# Patient Record
Sex: MALE | Race: OTHER | Employment: FULL TIME | URBAN - METROPOLITAN AREA
[De-identification: names, ages, dates, MRNs, and addresses within clinical notes are randomized per-mention and may not be internally consistent; named-entity substitution may affect disease eponyms.]

---

## 2024-03-11 ENCOUNTER — HOSPITAL ENCOUNTER (OUTPATIENT)
Age: 29
Discharge: HOME | End: 2024-03-11
Payer: COMMERCIAL

## 2024-03-11 VITALS
SYSTOLIC BLOOD PRESSURE: 144 MMHG | HEIGHT: 68 IN | WEIGHT: 220 LBS | HEART RATE: 58 BPM | TEMPERATURE: 98 F | DIASTOLIC BLOOD PRESSURE: 83 MMHG | OXYGEN SATURATION: 99 % | RESPIRATION RATE: 18 BRPM | BODY MASS INDEX: 33.34 KG/M2

## 2024-03-11 DIAGNOSIS — S86.011A RUPTURE OF RIGHT ACHILLES TENDON, INITIAL ENCOUNTER: Primary | ICD-10-CM

## 2024-03-11 PROCEDURE — 99203 OFFICE O/P NEW LOW 30 MIN: CPT | Performed by: NURSE PRACTITIONER

## 2024-03-11 RX ORDER — HYDROCODONE BITARTRATE AND ACETAMINOPHEN 5; 325 MG/1; MG/1
1 TABLET ORAL EVERY 6 HOURS PRN
Qty: 20 TABLET | Refills: 0 | Status: SHIPPED | OUTPATIENT
Start: 2024-03-11 | End: 2024-03-16

## 2024-03-11 NOTE — ED PROVIDER NOTES
Patient Seen in: [facility]      History     Chief Complaint   Patient presents with    Leg Pain     Stated Complaint: Achilles pain - pat believes it is ruptured    Subjective:   HPI  28-year-old male presents to the IC with complaints of a right calf/Achilles injury.  Patient was playing soccer last Monday, when he felt a sudden snap to the back of the calf.  Since then he has had increasing pain swelling and bruising to the area.  Has had a hard time walking and bearing weight on the extremity  The patient's medication list, past medical history and social history elements as listed in today's nurse's notes were reviewed and agreed (except as otherwise stated in the HPI).  The patient's family history reviewed and determined to be noncontributory to the presenting problem.      Objective:   History reviewed. No pertinent past medical history.           History reviewed. No pertinent surgical history.             Social History     Socioeconomic History    Marital status:    Tobacco Use    Smoking status: Never    Smokeless tobacco: Never   Substance and Sexual Activity    Drug use: Yes     Types: Cannabis              Review of Systems   Musculoskeletal:         Right calf pain/Achilles pain   All other systems reviewed and are negative.      Positive for stated complaint: Achilles pain - pat believes it is ruptured  Other systems are as noted in HPI.  Constitutional and vital signs reviewed.      All other systems reviewed and negative except as noted above.    Physical Exam     ED Triage Vitals [03/11/24 1013]   /83   Pulse 58   Resp 18   Temp 98.3 °F (36.8 °C)   Temp src Temporal   SpO2 99 %   O2 Device None (Room air)       Current:/83   Pulse 58   Temp 98.3 °F (36.8 °C) (Temporal)   Resp 18   Ht 172.7 cm (5' 8\")   Wt 99.8 kg   SpO2 99%   BMI 33.45 kg/m²         Physical Exam  Vitals and nursing note reviewed.   Constitutional:       Appearance: Normal appearance.    HENT:      Head: Normocephalic.   Cardiovascular:      Rate and Rhythm: Normal rate.      Pulses: Normal pulses.   Musculoskeletal:      Cervical back: Normal range of motion.      Right lower leg: Swelling and tenderness present. Edema present.      Comments: Ecchymosis is noted to the calf and also to the Achilles region.  Positive Upton's test noted   Skin:     General: Skin is warm.      Capillary Refill: Capillary refill takes less than 2 seconds.   Neurological:      General: No focal deficit present.      Mental Status: He is alert and oriented to person, place, and time. Mental status is at baseline.   Psychiatric:         Mood and Affect: Mood normal.         Behavior: Behavior normal.             ED Course   Labs Reviewed - No data to display  Was placed in a plantarflexion postmold crutches were given.  Patient was advised to follow-up with orthopedics               MDM   Pertinent Labs & Imaging studies reviewed. (See chart for details)  Differential diagnosis considered but not limited to: Achilles rupture, calf strain and Contusion  Patient coming in with Achilles pain. . Will treat for possible possible Achilles tendon rupture. Will discharge on [place] orthopedic follow-up. Patient is comfortable with this plan.  Overall Pt looks good. Non-toxic, well-hydrated and in no respiratory distress. Vital signs are reassuring. Exam is reassuring. I do not believe pt  requires and additional  diagnostic studiesor intervention. I believe pt  can be discharged home to continue evaluation as an outpatient. Follow-up provider given. Discharge instructions given and reviewed. Return for any problems. All understand and agreewith the plan.    Please note that this report has been produced using speech recognition software and may contain errors related to that system including, but not limited to, errors in grammar, punctuation, and spelling, as well as words and phrases that possibly may have been recognized  inappropriately.  If there are any questions or concerns, contact the dictating provider for clarification.    Note to patient: The 21st Century Cures Act makes medical notes like these available to patients in the interest of transparency. However, this is a medical document intended as peer to peer communication. It is written in medical language and may contain abbreviations or verbiage that are unfamiliar. It may appear blunt or direct. Medical documents are intended to carry relevant information, facts as evident, and the clinical opinion of the practitioner.                                    Medical Decision Making      Disposition and Plan     Clinical Impression:  1. Rupture of right Achilles tendon, initial encounter         Disposition:  Discharge  3/11/2024 10:38 am    Follow-up:  Vidal Diallo DO  [address] [place] [address] [phone]                Medications Prescribed:  Discharge Medication List as of 3/11/2024 10:41 AM        START taking these medications    Details   HYDROcodone-acetaminophen 5-325 MG Oral Tab Take 1 tablet by mouth every 6 (six) hours as needed for Pain., Normal, Disp-20 tablet, R-0

## 2024-03-11 NOTE — DISCHARGE INSTRUCTIONS
Follow-up with orthopedics call their office for a follow-up appointment  Wear the postmold for support and comfort do not take it off until you see orthopedics  Use the crutches for support and comfort  Tylenol and Motrin for pain  Use the Norco as needed for breakthrough pain  Return to the emergency room for symptoms or concerns

## 2024-03-13 ENCOUNTER — TELEPHONE (OUTPATIENT)
Dept: ORTHOPEDICS CLINIC | Facility: CLINIC | Age: 29
End: 2024-03-13

## 2024-03-13 DIAGNOSIS — M79.661 PAIN OF RIGHT LOWER LEG: Primary | ICD-10-CM

## 2024-03-14 ENCOUNTER — HOSPITAL ENCOUNTER (OUTPATIENT)
Dept: GENERAL RADIOLOGY | Age: 29
Discharge: HOME | End: 2024-03-14
Attending: FAMILY MEDICINE
Payer: COMMERCIAL

## 2024-03-14 ENCOUNTER — OFFICE VISIT (OUTPATIENT)
Dept: ORTHOPEDICS CLINIC | Facility: CLINIC | Age: 29
End: 2024-03-14
Payer: COMMERCIAL

## 2024-03-14 VITALS — BODY MASS INDEX: 31.5 KG/M2 | HEIGHT: 70 IN | WEIGHT: 220 LBS

## 2024-03-14 DIAGNOSIS — M24.171 DERANGEMENT OF RIGHT ANKLE: ICD-10-CM

## 2024-03-14 DIAGNOSIS — S86.011A RUPTURE OF RIGHT ACHILLES TENDON, INITIAL ENCOUNTER: Primary | ICD-10-CM

## 2024-03-14 DIAGNOSIS — M79.661 PAIN OF RIGHT LOWER LEG: ICD-10-CM

## 2024-03-14 PROCEDURE — 73590 X-RAY EXAM OF LOWER LEG: CPT | Performed by: FAMILY MEDICINE

## 2024-03-14 PROCEDURE — 3008F BODY MASS INDEX DOCD: CPT | Performed by: FAMILY MEDICINE

## 2024-03-14 PROCEDURE — 99204 OFFICE O/P NEW MOD 45 MIN: CPT | Performed by: FAMILY MEDICINE

## 2024-03-14 RX ORDER — DICLOFENAC SODIUM 75 MG/1
75 TABLET, DELAYED RELEASE ORAL 2 TIMES DAILY
Qty: 28 TABLET | Refills: 1 | Status: SHIPPED | OUTPATIENT
Start: 2024-03-14

## 2024-03-14 NOTE — H&P
Sports Medicine Clinic Note    Subjective:     Chief Complaint   Patient presents with    Leg or Foot Injury     Rt lower leg injury  doi: 03.04.2024  - pt tried to sprint from a dead stop and heard a snap  - no previous injury      HPI: This is a pleasant 28 year old male who presents with chief complaint of acute pain in the right calf area.     - The patient reports experiencing a sudden \"pop\" while pushing off the right leg during a soccer scrimmage followed by sharp pain and difficulty walking.   - The patient states that the pain is localized to the lower posterior aspect of his ankle.   - The pain exacerbates when trying to stand on tiptoes.  - Has tried OTC analgesics and ice application with minimal relief.   - Denies any similar past injuries, systemic symptoms like fever, or recent changes in medication.    Objective:     Physical Exam    Ht 5' 10\" (1.778 m)   Wt 220 lb (99.8 kg)   BMI 31.57 kg/m²     Constitutional:   NAD. AOx3. Well-developed and Well-nourished.     Psychiatric:   Normal mood/ affect/ behavior. Judgment and thought content normal.    Focused examination of the right lower extremity:    Inspection:   Antalgic gait  Swelling and ecchymosis observed over the left Achilles tendon area.     Palpation:   Tenderness upon palpation of the right Achilles tendon. A palpable gap is noted.    Active/Passive ROM:   Limited plantar flexion and difficulty standing on tiptoes in the affected extremity. Normal passive ROM about the ankle.    Neurovascular:   Intact sensation to light touch and normal capillary refill time in toes.     Special Orthopedic Tests:   Positive Upton test   Negative talar tilt  Negative ankle drawer testing    Diagnostic Studies    Radiographs of right tib/fib were personally reviewed in the office and no apparent fracture or dislocation is noted. No evidence of bony abnormalities or foreign bodies.    Assessment & Plan:     28 year old male with clinical history and  examination consistent with    Suspected acute rupture of right Achilles tendon    The patient will be treated with the following:    Recommend an MRI to further characterize the patient's injury with close follow up once completed to discuss options. In the interim the patient was fitted in a CAM boot with ankle in resting equinus. We did discuss potential for non-operative treatment for this injury depending on factors such as his MRI, health and overall activity, as well as patient preference. The patient may take NSAIDs for pain pain relief. Instructions for non-weight-bearing were given. A follow-up appointment is planned after MRI to augment treatment plan as necessary.    Vidal Diallo DO, MINERVA   Primary Care Sports Medicine    Department of Orthopaedic Surgery  [address] [address]   [address] [place] [address]    t: [phone]  f: [phone]      [facility]

## 2024-03-20 ENCOUNTER — OFFICE VISIT (OUTPATIENT)
Dept: ORTHOPEDICS CLINIC | Facility: CLINIC | Age: 29
End: 2024-03-20
Payer: COMMERCIAL

## 2024-03-20 ENCOUNTER — TELEPHONE (OUTPATIENT)
Dept: ORTHOPEDICS CLINIC | Facility: CLINIC | Age: 29
End: 2024-03-20

## 2024-03-20 VITALS — BODY MASS INDEX: 34.53 KG/M2 | WEIGHT: 220 LBS | HEIGHT: 67 IN

## 2024-03-20 DIAGNOSIS — S86.011A RUPTURE OF RIGHT ACHILLES TENDON, INITIAL ENCOUNTER: Primary | ICD-10-CM

## 2024-03-20 PROCEDURE — 99215 OFFICE O/P EST HI 40 MIN: CPT | Performed by: ORTHOPAEDIC SURGERY

## 2024-03-20 PROCEDURE — 99417 PROLNG OP E/M EACH 15 MIN: CPT | Performed by: ORTHOPAEDIC SURGERY

## 2024-03-20 PROCEDURE — 3008F BODY MASS INDEX DOCD: CPT | Performed by: ORTHOPAEDIC SURGERY

## 2024-03-20 RX ORDER — ASPIRIN 81 MG/1
81 TABLET, CHEWABLE ORAL DAILY
Qty: 30 TABLET | Refills: 0 | Status: SHIPPED | OUTPATIENT
Start: 2024-03-20 | End: 2024-04-19

## 2024-03-20 RX ORDER — TRAMADOL HYDROCHLORIDE 50 MG/1
TABLET ORAL
Qty: 20 TABLET | Refills: 1 | Status: SHIPPED | OUTPATIENT
Start: 2024-03-20

## 2024-03-20 RX ORDER — ONDANSETRON 4 MG/1
TABLET, FILM COATED ORAL
Qty: 8 TABLET | Refills: 0 | Status: SHIPPED | OUTPATIENT
Start: 2024-03-20

## 2024-03-20 NOTE — PROGRESS NOTES
OR BOOKING SHEET ANKLE  Location: [] [facility]   [] [facility]  Name: Guido Zhou  MRN: [id number]   : 1995     Diagnosis:  [x] Rupture of right Achilles tendon, initial encounter [S86.011A]    Disposition:    [x] Ambulatory  [] Overnight for ZANDER  [] Overnight for observation and pain control  [] Inpatient procedure    Operative Time Required:  90 MINUTES  Antibiotics: 2 g cefazolin within 60 minutes of surgical incision  Procedure:   Laterality: [x] RIGHT [] LEFT                  [] BILATERAL  Procedures:  [x] Achilles Tendon Repair (69590)  [] ORIF Medial Malleolus (42709)  [] ORIF Posterior Malleolus (46487)  [] ORIF Prox Fibula / Fibular Shaft (25328)  [] ORIF Distal Fibula (13917)  [] ORIF Bimalleolar Ankle Fracture (24441)  [] ORIF Trimalleolar Ankle Fracture (02438)  [] ORIF Trimalleolar Ankle Fracture w/ Fixation of Posterior Mal (06026)  [] ORIF Metatarsal (11544)    Additional info:   [] PCP Clearance Needed  [x] Physical Therapy External - [facility]  [] DME Rx Needed  Special Equipment: Arthrex  Assistant request: POONAM Spears

## 2024-03-20 NOTE — H&P
Orthopedic Surgery  [address] [place] [address]  [phone]     NEW PATIENT VISIT - HISTORY AND PHYSICAL EXAMINATION     Name: Guido Zhou   MRN: [id number]  Date: 3/20/2024     CC: Right achilles tendon rupture    REFERRED BY: Vidal Diallo DO    HPI:   Guido Zhou is a very pleasant 28 year old male who presents today for evaluation of right achilles injury sustained March 4, 2024 while playing soccer. He experienced a pop while pushing off the right leg. Pain is 6/10 with swelling, stiffness, weakness and instability. Denies any prior ankle injuries.     He has been unable to ambulate and pushoff with the Achilles in the interval.  He is quite functionally debilitated.    He enjoys playing sports. Works as an .    PMH:   History reviewed. No pertinent past medical history.    PAST SURGICAL HX:  History reviewed. No pertinent surgical history.    FAMILY HX:  History reviewed. No pertinent family history.    ALLERGIES:  Patient has no known allergies.    MEDICATIONS:   Current Outpatient Medications   Medication Sig Dispense Refill    diclofenac 75 MG Oral Tab EC Take 1 tablet (75 mg total) by mouth 2 (two) times daily. 28 tablet 1       ROS: A comprehensive 14 point review of systems was performed and was negative aside from the aforementioned per history of present illness.    SOCIAL HX:  Social History     Tobacco Use    Smoking status: Never    Smokeless tobacco: Never   Substance Use Topics    Alcohol use: Not on file         PE:   Vitals:    03/20/24 1112   Weight: 220 lb   Height: 5' 7\" (1.702 m)     Estimated body mass index is 34.46 kg/m² as calculated from the following:    Height as of this encounter: 5' 7\" (1.702 m).    Weight as of this encounter: 220 lb.    Physical Exam  Constitutional:       Appearance: Normal appearance.   HENT:      Head: Normocephalic and atraumatic.   Eyes:      Extraocular Movements: Extraocular movements intact.   Neck:       Musculoskeletal: Normal range of motion and neck supple.   Cardiovascular:      Pulses: Normal pulses.   Pulmonary:      Effort: Pulmonary effort is normal. No respiratory distress.   Abdominal:      General: There is no distension.   Skin:     General: Skin is warm.      Capillary Refill: Capillary refill takes less than 2 seconds.      Findings: No bruising.   Neurological:      General: No focal deficit present.      Mental Status: Alert.   Psychiatric:         Mood and Affect: Mood normal.     Examination of the right achilles demonstrates:     Skin is intact, warm and dry.     Inspection:   Atrophy: none    Effusion: none    Edema: none    Erythema: none    Hematoma: none      Palpation:   Anterior Talo-Fibular Ligament (ATFL): none    Fibular Ligament (PTFL): none    Calcaneo-Fibular Ligament (CFL): none    Achilles Tendon: significant    Peroneal Tendon: none    Posterior Tib Tendon: none    Talar dome: none    Metatarsal bones: none    Joint line tenderness: none  Crepitation: none     ROM:   Dorsiflexion: 10 degrees.  Plantarflexion: 0 degrees.  Eversion:  20 degrees  Inversion: 30 degrees.    Strength: significantly weak     Special Tests:   Positive Upton's test with loss of ankle dorsiflexion.    No obvious peripheral edema noted.   Distal neurovascular exam demonstrates normal perfusion, intact sensation to light touch and full strength.     Examination of the contralateral foot/ankle demonstrates:  No significant atrophy, swelling or effusion. Full range of motion. Neurovascularly intact distally.    Radiographic Examination/Diagnostics:  XR personally viewed, independently interpreted and radiology report was reviewed.    XR TIBIA + FIBULA (2 VIEWS), RIGHT (CPT=73590)    Result Date: 3/14/2024  CONCLUSION:  No acute fracture or dislocation right tibia or fibula.   LOCATION:  [id number]   Dictated by (CST): Raegan Ty MD on 3/14/2024 at 4:47 PM     Finalized by (CST): Raegan Ty MD on 3/14/2024  at 4:48 PM        IMPRESSION: Guido Zhou is a 28 year old male with right Achilles tendon rupture sustained March 4, 2024 while playing soccer.     Given his goals to return to soccer and high-level athletics, the patient is an appropriate candidate for Achilles tendon repair.     PLAN:   We had a detailed discussion outlining the etiology, anatomy, pathophysiology, and natural history of patient's findings. Imaging was reviewed in detail.    I had a lengthy discussion with Guido about the diagnosis and options, both surgical and nonsurgical. I have recommended that we proceed with Achilles tendon repair as we agree surgical intervention would likely offer the best opportunity for symptomatic relief and functional recovery. I used diagrams, radiographic studies, and a 3-dimensional model to outline his pathology, as well as general surgical principles. We reviewed the risks associated with the proposed procedure.  In particular we discussed risks that include, but are not limited to infection, blood loss, potential transient or permanent injury to nerves or blood vessels, joint stiffness, persistent pain, need for future operation, failure of healing, wound complications, deep vein thrombosis and pulmonary embolism.   We discussed the proposed rehabilitation timeline as well as expected postoperative restrictions. Guido voiced a good understanding of treatment options, risks and benefits, postoperative instructions, rehabilitation timeline, and restrictions. He was given the opportunity to ask questions, which were all answered to the best of my ability and to his satisfaction. Guido will work with my office to arrange a time for surgery and obtain any medical clearance information necessary. My pre-operative information packet, which details the process and answers many FAQ's will be provided. He was encouraged to call the office with any further questions or concerns.   I spent 60 minutes in preparation to  see the patient, counseling/education of relevant pathology, discussing imaging results, ordering therapy  intervention, care coordination, and documentation into the electronic medical record.      FOLLOW-UP:  Post-Operative Visit, POD 8        Fay Galicia MD  Knee, Shoulder, & Elbow Surgery / Sports Medicine Specialist  Orthopaedic Surgery  [address] [place] [address] [id number] [place] [address].[facility]  [email or web address]  t: [phone]  o: [phone]  f: [phone]    This note was dictated using Dragon software.  While it was briefly proofread prior to completion, some grammatical, spelling, and word choice errors due to dictation may still occur.

## 2024-03-20 NOTE — TELEPHONE ENCOUNTER
Date of Surgery:  [facility] 3/21/2024     Post Op Appt:  3/25/2024 830AM    Case ID: [id number]     Notes:             OR BOOKING SHEET ANKLE  Location:                  [] [facility]                    [] [facility]  Name: Guido Zhou  MRN: [id number]   : 1995     Diagnosis:  [x] Rupture of right Achilles tendon, initial encounter [S86.011A]     Disposition:    [x] Ambulatory  [] Overnight for ZANDER  [] Overnight for observation and pain control  [] Inpatient procedure     Operative Time Required:  90 MINUTES  Antibiotics: 2 g cefazolin within 60 minutes of surgical incision  Procedure:   Laterality:                  [x] RIGHT                  [] LEFT                                   [] BILATERAL  Procedures:  [x] Achilles Tendon Repair (18464)  [] ORIF Medial Malleolus (81570)  [] ORIF Posterior Malleolus (57055)  [] ORIF Prox Fibula / Fibular Shaft (24618)  [] ORIF Distal Fibula (75849)  [] ORIF Bimalleolar Ankle Fracture (53399)  [] ORIF Trimalleolar Ankle Fracture (61923)  [] ORIF Trimalleolar Ankle Fracture w/ Fixation of Posterior Mal (62139)  [] ORIF Metatarsal (04752)     Additional info:   [] PCP Clearance Needed  [x] Physical Therapy External - [facility]  [] DME Rx Needed  Special Equipment: Arthrex  Assistant request: POONAM Spears

## 2024-03-22 ENCOUNTER — PATIENT MESSAGE (OUTPATIENT)
Dept: ORTHOPEDICS CLINIC | Facility: CLINIC | Age: 29
End: 2024-03-22

## 2024-03-22 DIAGNOSIS — S86.011A RUPTURE OF RIGHT ACHILLES TENDON, INITIAL ENCOUNTER: ICD-10-CM

## 2024-03-22 RX ORDER — HYDROCODONE BITARTRATE AND ACETAMINOPHEN 5; 325 MG/1; MG/1
1 TABLET ORAL EVERY 6 HOURS PRN
Qty: 20 TABLET | Refills: 0 | Status: SHIPPED | OUTPATIENT
Start: 2024-03-22 | End: 2024-03-27

## 2024-03-22 NOTE — TELEPHONE ENCOUNTER
From: Guido Zhou  To: Fay Galicia  Sent: 3/22/2024 10:00 AM CDT  Subject: Medication    Tramadol not working please call me SOS

## 2024-03-22 NOTE — TELEPHONE ENCOUNTER
Norco pending  DOS: 3/21/24  Right achilles tendon repair    Pain is unmanaged between 6-10.    Taking the tramadol and acetaminophen as prescribed.

## 2024-03-25 ENCOUNTER — OFFICE VISIT (OUTPATIENT)
Dept: ORTHOPEDICS CLINIC | Facility: CLINIC | Age: 29
End: 2024-03-25
Payer: COMMERCIAL

## 2024-03-25 DIAGNOSIS — S86.011D RUPTURE OF RIGHT ACHILLES TENDON, SUBSEQUENT ENCOUNTER: Primary | ICD-10-CM

## 2024-03-25 RX ORDER — POLYETHYLENE GLYCOL 3350 17 G/17G
17 POWDER, FOR SOLUTION ORAL DAILY PRN
Qty: 1 EACH | Refills: 0 | Status: SHIPPED | OUTPATIENT
Start: 2024-03-25 | End: 2024-04-08

## 2024-03-25 RX ORDER — ONDANSETRON 4 MG/1
4 TABLET, ORALLY DISINTEGRATING ORAL
COMMUNITY
Start: 2024-01-11

## 2024-03-25 RX ORDER — BISACODYL 10 MG
10 SUPPOSITORY, RECTAL RECTAL DAILY
Qty: 5 SUPPOSITORY | Refills: 0 | Status: SHIPPED | OUTPATIENT
Start: 2024-03-25

## 2024-03-25 NOTE — PROGRESS NOTES
[facility]  [address] [place] [address]  [phone]       Name: Guido Zhou   MRN: [id number]  Date: 3/25/2024     REASON FOR VISIT: First Post-Surgical Visit   Surgery: Right Achilles Tendon Repair on 3/21/2024.    INTERVAL HISTORY:  Guido Zhou is a 28 year old male who returns after the aforementioned procedure.  The post-operative course has been unremarkable with pain well controlled and overall progress noted.     To Summarize: Physical therapy was started and is progressing well.  The patient denies any calf pain or tenderness, fevers, chills, sweats or signs of active infection. The patient has been compliant with the postoperative protocol, and admits to normal bowel and bladder function. No acute issues.     Today, patient is in office for a follow-up. Prior pain levels were 6-8/10 but he is doing well now. He is starting physical therpy later today. Patient mentions some stool concerns and is taking Narco for pain management.       PE:   There were no vitals filed for this visit.  Estimated body mass index is 34.46 kg/m² as calculated from the following:    Height as of 3/20/24: 5' 7\" (1.702 m).    Weight as of 3/20/24: 220 lb.    Physical Exam  Constitutional:       Appearance: Normal appearance.   HENT:      Head: Normocephalic and atraumatic.   Eyes:      Extraocular Movements: Extraocular movements intact.   Neck:      Musculoskeletal: Normal range of motion and neck supple.   Cardiovascular:      Pulses: Normal pulses.   Pulmonary:      Effort: Pulmonary effort is normal. No respiratory distress.   Abdominal:      General: There is no distension.   Skin:     General: Skin is warm.      Capillary Refill: Capillary refill takes less than 2 seconds.      Findings: No bruising.   Neurological:      General: No focal deficit present.      Mental Status: She is alert.   Psychiatric:         Mood and Affect: Mood normal.     Examination of the right  achillis tendon demonstrates:     Well appearing surgical incisions / nylon in place.     The contralateral achillis is without limitation in range of motion or strength, no positive provocative maneuvers.     Radiographic Examination/Diagnostics:    MRI/X-Ray personally viewed, independently interpreted and radiology report was reviewed.    Insight MRI LOWER LEG, RIGHT (CPT=73718)    Result Date: 3/20/2024  Exam: MRI LOWER LEG RT W/O CONTRAST CPT Code(s): 51386 - MRI LOWER EXTREMITY W/O DYE EXAM: MRI right lower leg without contrast 3/20/2024. COMPARISON:  None. INDICATION: Rupture of right Achilles tendon. Derangement of right ankle. Suspected acute right Achilles rupture, preoperative planning. Pain and swelling of the right lower leg. Per clinical note, injured playing soccer. TECHNIQUE:  Multiplanar multisequence MR images of the right lower leg/tibia fibula were acquired on a 3 Claire imaging system without intravenous contrast. Limited coronal images of the contralateral left lower leg were also obtained for comparison. FINDINGS:  No acute fracture. Normal osseous alignment. Complete disruption of the Achilles tendon approximately 4.5 cm proximal to the superior margin of the posterior calcaneal process with an approximately 1 cm fluid-filled gap to the level of the myotendinous junction. Thickening and heterogeneity of the myotendinous junction with additional interstitial tearing. Mild to moderate edema within the soleus muscle at the mid to distal calf suggests mild strain. No significant fatty atrophy of the visualized musculature. The remainder of the myotendinous structures appear intact. Mild bone marrow edema is questioned at the posterolateral aspect of the contralateral left tibial plateau.   IMPRESSION:   1. Complete disruption of the right Achilles tendon with an approximately 1 cm gap and interstitial tearing at the myotendinous junction.   2. Mild soleus muscle strain.   3. Possible mild bone  contusion versus stress/reactive changes within the contralateral left tibial plateau. This report was performed utilizing speech recognition software technology. Despite thorough proofreading, speech recognition errors could escape detection. If a word or phrase is confusing or out of context, please do not hesitate to call for clarification.   Interpreting Radiologist: Shira Leonardo M.D. Electronically Signed: 03/20/2024 04:02 PM    XR TIBIA + FIBULA (2 VIEWS), RIGHT (CPT=73590)    Result Date: 3/14/2024  PROCEDURE:  XR TIBIA + FIBULA (2 VIEWS), RIGHT (CPT=73590)  TECHNIQUE:  AP and lateral views of the tibia and fibula were obtained.  COMPARISON:  None.  INDICATIONS:  M79.661 Pain of right lower leg  PATIENT STATED HISTORY: (As transcribed by Technologist)  Patient is here for ortho evaluation. Patient injured his right ankle playing soccer on 3/4/24 and has pain to his achilles tendon.    FINDINGS:  BONES:  Normal.  No significant arthropathy or acute abnormality. SOFT TISSUES:  No abnormal soft tissue swelling.. EFFUSION:  None visible.            CONCLUSION:  No acute fracture or dislocation right tibia or fibula.     LOCATION:  [id number]   Dictated by (CST): Raegan Ty MD on 3/14/2024 at 4:47 PM     Finalized by (CST): Raegan Ty MD on 3/14/2024 at 4:48 PM       IMPRESSION: Guido Zhou is a 28 year old male who presents 4 days s/p Right Achilles Tendon Repair on 3/21/2024.    We fitted patient with walking boot and gave a suture removal kit during office visit today.     PLAN:   We had a lengthy discussion with the patient regarding the patient's findings consistent with the expected postoperative course. We recommend continuation of physical therapy with rehabilitation efforts focused on strengthening, range of motion, functional ability, and return to baseline activity. The patient can continue to progress per protocol.     All questions were answered appropriately and the patient was in agreement with  the treatment plan.     FOLLOW-UP:  Return to clinic in 4 weeks.            Fay Galicia MD  Knee, Shoulder, & Elbow Surgery / Sports Medicine Specialist  Orthopaedic Surgery  [address] [place] [address].[facility]  [email or web address]  t: [phone]  o: [phone]  f: [phone]

## 2024-04-15 ENCOUNTER — OFFICE VISIT (OUTPATIENT)
Dept: ORTHOPEDICS CLINIC | Facility: CLINIC | Age: 29
End: 2024-04-15
Payer: COMMERCIAL

## 2024-04-15 DIAGNOSIS — S86.011D RUPTURE OF RIGHT ACHILLES TENDON, SUBSEQUENT ENCOUNTER: Primary | ICD-10-CM

## 2024-04-15 PROCEDURE — 99024 POSTOP FOLLOW-UP VISIT: CPT | Performed by: PHYSICIAN ASSISTANT

## 2024-04-15 NOTE — PROGRESS NOTES
[facility] ORTHOPEDICS  [address] [place] [id number]  [phone]       Name: Guido Zhou   MRN: [id number]  Date: 4/15/2024     REASON FOR VISIT: Second Post-Surgical Visit   Surgery: Right Achilles Tendon Repair on 3/21/2024.     INTERVAL HISTORY:  Guido Zhou is a 28 year old male who returns after the aforementioned procedure.  The post-operative course has been unremarkable with pain well controlled and overall progress noted.     Physical therapy was started and is progressing well.  Working with [facility]. 1/10 pain.     ROS: ROS    PE:   There were no vitals filed for this visit.  Estimated body mass index is 34.46 kg/m² as calculated from the following:    Height as of 3/20/24: 5' 7\" (1.702 m).    Weight as of 3/20/24: 220 lb (99.8 kg).    Physical Exam  Constitutional:       Appearance: Normal appearance.   HENT:      Head: Normocephalic and atraumatic.   Eyes:      Extraocular Movements: Extraocular movements intact.   Neck:      Musculoskeletal: Normal range of motion and neck supple.   Cardiovascular:      Pulses: Normal pulses.   Pulmonary:      Effort: Pulmonary effort is normal. No respiratory distress.   Abdominal:      General: There is no distension.   Skin:     General: Skin is warm.      Capillary Refill: Capillary refill takes less than 2 seconds.      Findings: No bruising.   Neurological:      General: No focal deficit present.      Mental Status: She is alert.   Psychiatric:         Mood and Affect: Mood normal.     Examination of the right ankle demonstrates:     Physical examination the patient is alert and oriented x3, well-developed, well-nourished, no acute distress.   Nylon sutures removed. Intact Upton test.   Improved ROM.      Incisional sites are clean dry intact without signs of active pathology.  Calf is soft and nontender to palpation.    The contralateral knee is without limitation in range of motion or strength, no positive  provocative maneuvers.       IMPRESSION: Guido Zhou is a 28 year old male who presents 4 weeks s/p Right Achilles Tendon Repair on 3/21/2024.     PLAN:   We had a lengthy discussion with the patient regarding the patient's findings consistent with the expected postoperative course. We recommend continuation of physical therapy with rehabilitation efforts focused on strengthening, range of motion, functional ability, and return to baseline activity. The patient can continue to progress per protocol.    All questions were answered appropriately and the patient was in agreement with the treatment plan.       FOLLOW-UP:  Return to clinic in eight weeks. No imaging required at next visit.             Jasnor GISELE Martinez [facility], PA-C Orthopedic Surgery / Sports Medicine Specialist  [facility] Orthopaedic Surgery  [address] [place] [address].org  [email or web address]  t: [phone]  o: [phone]  f: [phone]    This note was dictated using Dragon software.  While it was briefly proofread prior to completion, some grammatical, spelling, and word choice errors due to dictation may still occur.

## (undated) NOTE — LETTER
Date: 3/20/2024    Patient Name: Guido Zhou          To Whom it may concern:    This letter has been written at the patient's request. The above patient was seen at [facility] for treatment of a medical condition.    This patient is scheduled for Right Achilles Tendon Surgery tomorrow, 03/21/2024. This patient should be excused from work beginning tomorrow.     Sincerely,      Fay Galicia MD  Knee, Shoulder, & Elbow Surgery / Sports Medicine Specialist  [facility] Orthopaedic Surgery  [address] [place] [address], [address] [place] [address].org  [email or web address]  t: [phone]  o: [phone]  f: [phone]       Fay Galicia MD

## (undated) NOTE — LETTER
Date: 3/14/2024    Patient Name: Guido Zhou    To Whom it may concern:    Zahraa Zhou was seen at my clinic as part of an acute medical consultation. Please excuse any tardiness or absence from work today in lieu of this.    Please feel free to reach out to our office if any further information is needed.    Sincerely,            Vidal Diallo DO, MINERVA   Primary Care Sports Medicine    Department of Orthopaedic Surgery  [address] [address]   [address] [place] [address]    t: [phone]  f: [phone]      [facility]

## (undated) NOTE — Clinical Note
This patient expressed some iinterest in surgery for quicker return to sport, I was originally going to send this one Chapa's way and messaged him last week he got to me this weekend saying he's out on vacay. Let's touch base about this Monday!

## (undated) NOTE — LETTER
Date & Time: 3/11/2024, 10:41 AM  Patient: Guido Zhou  Encounter Provider(s):    Jesus Foster APRN       To Whom It May Concern:    Guido Zhou was seen and treated in our department on 3/11/2024. He  was accompanied by his spouse, Zahraa Zhou .    If you have any questions or concerns, please do not hesitate to call.        _____________________________  Physician/APC Signature

## (undated) NOTE — LETTER
Date & Time: 3/11/2024, 10:40 AM  Patient: Guido Zhou  Encounter Provider(s):    Jesus Foster APRN       To Whom It May Concern:    Guido Zhou was seen and treated in our department on 3/11/2024. He should not return to work until 3/13/24 .    If you have any questions or concerns, please do not hesitate to call.        _____________________________  Physician/APC Signature